# Patient Record
Sex: MALE | Race: WHITE | Employment: FULL TIME | URBAN - NONMETROPOLITAN AREA
[De-identification: names, ages, dates, MRNs, and addresses within clinical notes are randomized per-mention and may not be internally consistent; named-entity substitution may affect disease eponyms.]

---

## 2022-07-28 ENCOUNTER — OFFICE VISIT (OUTPATIENT)
Dept: FAMILY MEDICINE CLINIC | Age: 52
End: 2022-07-28
Payer: COMMERCIAL

## 2022-07-28 VITALS
HEART RATE: 55 BPM | SYSTOLIC BLOOD PRESSURE: 137 MMHG | TEMPERATURE: 98.1 F | DIASTOLIC BLOOD PRESSURE: 91 MMHG | OXYGEN SATURATION: 99 % | WEIGHT: 156.6 LBS

## 2022-07-28 DIAGNOSIS — R21 LOCALIZED RASH: Primary | ICD-10-CM

## 2022-07-28 PROCEDURE — 99213 OFFICE O/P EST LOW 20 MIN: CPT | Performed by: STUDENT IN AN ORGANIZED HEALTH CARE EDUCATION/TRAINING PROGRAM

## 2022-07-28 ASSESSMENT — PATIENT HEALTH QUESTIONNAIRE - PHQ9
SUM OF ALL RESPONSES TO PHQ QUESTIONS 1-9: 0
2. FEELING DOWN, DEPRESSED OR HOPELESS: 0
SUM OF ALL RESPONSES TO PHQ QUESTIONS 1-9: 0
SUM OF ALL RESPONSES TO PHQ9 QUESTIONS 1 & 2: 0
1. LITTLE INTEREST OR PLEASURE IN DOING THINGS: 0

## 2022-07-28 ASSESSMENT — ENCOUNTER SYMPTOMS
COUGH: 0
SHORTNESS OF BREATH: 0

## 2022-07-28 NOTE — PROGRESS NOTES
[address] [address]  Dept: [phone]  Dept Fax: [phone]  Loc: [phone]    Bogdan Carcamo is a 46 y.o. male who presents today for his medical conditions/complaints as noted below. Chief Complaint   Patient presents with    Leg Swelling     Right leg pain swelling possible tick       HPI:     Patient presents office today for the concern of a possible tick/tick bite on his leg. States that about 1 week ago he noticed a red bump on the front of his right thigh. Over the last week it has become more red and has spread out and is now about the size of a quarter. Has had some itching, no significant pain. Denies drainage or bleeding. He tried putting DEET on the area which did not cause any change. States that he is outside a lot and was living in [place] recently, so he wanted to make sure that this was not a tick or Lyme's disease. Does not remember seeing a tick on his body or leg    Denies fever or chills. History reviewed. No pertinent past medical history. History reviewed. No pertinent surgical history. History reviewed. No pertinent family history. Social History     Tobacco Use    Smoking status: Never    Smokeless tobacco: Never   Substance Use Topics    Alcohol use: Never      No current outpatient medications on file. No current facility-administered medications for this visit.      No Known Allergies    Health Maintenance   Topic Date Due    COVID-19 Vaccine (1) Never done    Depression Screen  Never done    HIV screen  Never done    Hepatitis C screen  Never done    Lipids  Never done    Colorectal Cancer Screen  Never done    Shingles vaccine (1 of 2) Never done    Flu vaccine (1) 09/01/2022    DTaP/Tdap/Td vaccine (4 - Td or Tdap) 05/10/2032    Hepatitis A vaccine  Aged Out    Hepatitis B vaccine  Aged Out    Hib vaccine  Aged Out    Meningococcal (ACWY) vaccine  Aged Out    Pneumococcal 0-64 years Vaccine  Aged Out       Subjective:      Review of Systems   Constitutional:  Negative for chills and fever. Respiratory:  Negative for cough and shortness of breath. Cardiovascular:  Negative for chest pain. Skin:  Positive for rash. Objective:     Physical Exam  Vitals and nursing note reviewed. Constitutional:       General: He is not in acute distress. Appearance: Normal appearance. He is well-developed and normal weight. He is not ill-appearing or toxic-appearing. HENT:      Head: Normocephalic and atraumatic. Eyes:      General: Lids are normal.      Conjunctiva/sclera: Conjunctivae normal.   Cardiovascular:      Rate and Rhythm: Normal rate and regular rhythm. Heart sounds: Normal heart sounds. No murmur heard. Pulmonary:      Effort: Pulmonary effort is normal.      Breath sounds: Normal breath sounds and air entry. No wheezing, rhonchi or rales. Musculoskeletal:      Right lower leg: No edema. Left lower leg: No edema. Skin:     General: Skin is warm and dry. Comments: Approx 1.5 cm x 1.5 cm circular erythematous rash with very small central bump and surrounding red dots present on anterior right thigh; no active drainage or bleeding noted   Neurological:      General: No focal deficit present. Mental Status: He is alert and oriented to person, place, and time. Gait: Gait is intact. Psychiatric:         Mood and Affect: Mood and affect normal.         Behavior: Behavior is cooperative. BP (!) 137/91 (Site: Left Upper Arm, Position: Sitting, Cuff Size: Large Adult)   Pulse 55   Temp 98.1 °F (36.7 °C)   Wt 156 lb 9.6 oz (71 kg)   SpO2 99%     Assessment/Plan:   Olivia Moran was seen today for leg swelling. Diagnoses and all orders for this visit:    Localized rash    30-year-old healthy male presenting with a small, localized rash to right anterior thigh x7 days. Does not appear infected this time.   Etiology unclear, though does not look like a bull's-eye rash-patient reassured that this does not appear to be a Lyme's disease rash at this time, though he was advised to contact our office or his PCP if rash changes or develops more into a bullseye rash. No antibiotic treatment warranted. Patient voiced that it is not bothersome to need any further treatment at this time. He was advised to monitor symptoms closely and if worsening or persisting, needs to be seen for re-evaluation. Return if symptoms worsen or fail to improve.     Electronically signed by Armida Steiner DO on 7/28/2022 at 12:54 PM

## 2023-01-11 LAB
CHOLESTEROL, TOTAL: 232 MG/DL
CHOLESTEROL/HDL RATIO: ABNORMAL
HDLC SERPL-MCNC: 85 MG/DL (ref 35–70)
LDL CHOLESTEROL: 131
NONHDLC SERPL-MCNC: ABNORMAL MG/DL
TRIGL SERPL-MCNC: 79 MG/DL
VLDLC SERPL CALC-MCNC: 16 MG/DL

## 2023-10-30 ENCOUNTER — OFFICE VISIT (OUTPATIENT)
Dept: FAMILY MEDICINE CLINIC | Age: 53
End: 2023-10-30
Payer: COMMERCIAL

## 2023-10-30 VITALS
DIASTOLIC BLOOD PRESSURE: 80 MMHG | RESPIRATION RATE: 16 BRPM | HEIGHT: 72 IN | BODY MASS INDEX: 23.11 KG/M2 | WEIGHT: 170.6 LBS | SYSTOLIC BLOOD PRESSURE: 106 MMHG | OXYGEN SATURATION: 98 % | HEART RATE: 58 BPM

## 2023-10-30 DIAGNOSIS — H91.91 CHANGE IN HEARING, RIGHT: ICD-10-CM

## 2023-10-30 DIAGNOSIS — H92.01 RIGHT EAR PAIN: Primary | ICD-10-CM

## 2023-10-30 PROCEDURE — 99213 OFFICE O/P EST LOW 20 MIN: CPT | Performed by: STUDENT IN AN ORGANIZED HEALTH CARE EDUCATION/TRAINING PROGRAM

## 2023-10-30 ASSESSMENT — ENCOUNTER SYMPTOMS
VOMITING: 0
NAUSEA: 0
COUGH: 0
DIARRHEA: 0
SORE THROAT: 0
WHEEZING: 0
ABDOMINAL PAIN: 0

## 2023-10-30 ASSESSMENT — PATIENT HEALTH QUESTIONNAIRE - PHQ9
SUM OF ALL RESPONSES TO PHQ QUESTIONS 1-9: 0
1. LITTLE INTEREST OR PLEASURE IN DOING THINGS: 0
SUM OF ALL RESPONSES TO PHQ9 QUESTIONS 1 & 2: 0
2. FEELING DOWN, DEPRESSED OR HOPELESS: 0
SUM OF ALL RESPONSES TO PHQ QUESTIONS 1-9: 0

## 2023-10-30 NOTE — PROGRESS NOTES
[address] [employer] [facility]  [address] [place] [address]  Dept: [phone]  Dept Fax: [phone]  Loc: [phone]    Steve West is a 48 y.o. male who presents today for his medical conditions/complaints as noted below. Chief Complaint   Patient presents with    Otalgia     Right side sx for 3 weeks, pt states he bought ear drops with no relief        HPI:     Patient presents to the office today for concerns of right ear pain and humming sensation. Patient states that he was traveling for work in [place] about 3 weeks ago. As he was returning home from this trip, he noticed right ear buzzing/humming and some discomfort. This has worsened since that time. Denies pulsatile or whooshing sensation in ear. He has been trying OTC eardrops without relief. Denies associated fever, sore throat, nasal congestion/drainage, or cough. States that he did start taking PreserVision around the time of symptom onset, but stopped this medication about 2 weeks ago to see if it would make a difference. History reviewed. No pertinent past medical history. History reviewed. No pertinent surgical history. History reviewed. No pertinent family history. Social History     Tobacco Use    Smoking status: Never    Smokeless tobacco: Never   Substance Use Topics    Alcohol use: Never      No current outpatient medications on file. No current facility-administered medications for this visit.      No Known Allergies    Health Maintenance   Topic Date Due    Hepatitis B vaccine (1 of 3 - 3-dose series) Never done    COVID-19 Vaccine (1) Never done    HIV screen  Never done    Hepatitis C screen  Never done    Lipids  Never done    Colorectal Cancer Screen  Never done    Shingles vaccine (1 of 2) Never done    Depression Screen  07/28/2023    Flu vaccine (1) Never done    DTaP/Tdap/Td vaccine (3 - Td or Tdap) 05/06/2032    Hepatitis A vaccine  Aged Out

## 2024-02-07 ENCOUNTER — OFFICE VISIT (OUTPATIENT)
Dept: FAMILY MEDICINE CLINIC | Age: 54
End: 2024-02-07
Payer: COMMERCIAL

## 2024-02-07 VITALS
OXYGEN SATURATION: 97 % | SYSTOLIC BLOOD PRESSURE: 120 MMHG | HEIGHT: 72 IN | TEMPERATURE: 98.4 F | BODY MASS INDEX: 22.89 KG/M2 | WEIGHT: 169 LBS | HEART RATE: 73 BPM | DIASTOLIC BLOOD PRESSURE: 74 MMHG

## 2024-02-07 DIAGNOSIS — J02.9 SORE THROAT: Primary | ICD-10-CM

## 2024-02-07 DIAGNOSIS — H66.003 ACUTE SUPPURATIVE OTITIS MEDIA OF BOTH EARS WITHOUT SPONTANEOUS RUPTURE OF TYMPANIC MEMBRANES, RECURRENCE NOT SPECIFIED: ICD-10-CM

## 2024-02-07 LAB
INFLUENZA VIRUS A RNA: NEGATIVE
INFLUENZA VIRUS B RNA: NEGATIVE

## 2024-02-07 PROCEDURE — 99214 OFFICE O/P EST MOD 30 MIN: CPT | Performed by: STUDENT IN AN ORGANIZED HEALTH CARE EDUCATION/TRAINING PROGRAM

## 2024-02-07 PROCEDURE — 87502 INFLUENZA DNA AMP PROBE: CPT | Performed by: STUDENT IN AN ORGANIZED HEALTH CARE EDUCATION/TRAINING PROGRAM

## 2024-02-07 RX ORDER — AMOXICILLIN AND CLAVULANATE POTASSIUM 875; 125 MG/1; MG/1
1 TABLET, FILM COATED ORAL 2 TIMES DAILY
Qty: 14 TABLET | Refills: 0 | Status: SHIPPED | OUTPATIENT
Start: 2024-02-07 | End: 2024-02-14

## 2024-02-07 SDOH — ECONOMIC STABILITY: FOOD INSECURITY: WITHIN THE PAST 12 MONTHS, YOU WORRIED THAT YOUR FOOD WOULD RUN OUT BEFORE YOU GOT MONEY TO BUY MORE.: NEVER TRUE

## 2024-02-07 SDOH — ECONOMIC STABILITY: HOUSING INSECURITY
IN THE LAST 12 MONTHS, WAS THERE A TIME WHEN YOU DID NOT HAVE A STEADY PLACE TO SLEEP OR SLEPT IN A SHELTER (INCLUDING NOW)?: NO

## 2024-02-07 SDOH — ECONOMIC STABILITY: INCOME INSECURITY: HOW HARD IS IT FOR YOU TO PAY FOR THE VERY BASICS LIKE FOOD, HOUSING, MEDICAL CARE, AND HEATING?: NOT HARD AT ALL

## 2024-02-07 SDOH — ECONOMIC STABILITY: FOOD INSECURITY: WITHIN THE PAST 12 MONTHS, THE FOOD YOU BOUGHT JUST DIDN'T LAST AND YOU DIDN'T HAVE MONEY TO GET MORE.: NEVER TRUE

## 2024-02-07 ASSESSMENT — ENCOUNTER SYMPTOMS
COUGH: 0
DIARRHEA: 0
NAUSEA: 0
SHORTNESS OF BREATH: 0
VOMITING: 0
CONSTIPATION: 0

## 2024-02-07 ASSESSMENT — PATIENT HEALTH QUESTIONNAIRE - PHQ9
SUM OF ALL RESPONSES TO PHQ9 QUESTIONS 1 & 2: 0
SUM OF ALL RESPONSES TO PHQ QUESTIONS 1-9: 0
1. LITTLE INTEREST OR PLEASURE IN DOING THINGS: 0
2. FEELING DOWN, DEPRESSED OR HOPELESS: 0
SUM OF ALL RESPONSES TO PHQ QUESTIONS 1-9: 0

## 2024-02-07 NOTE — PROGRESS NOTES
[facility]  [facility]  [address] [facility] [id number] [phone]     Soledad Shell is a 53 y.o. male who presents today for:  Chief Complaint   Patient presents with    Pharyngitis     Went to [facility], sore throat for 4 days, sinus pressure, cough, fatigued, body aches.   At home covid was negative this morning. Has been taking tylenol        Assessment/Plan:     Soledad was seen today for pharyngitis.    Diagnoses and all orders for this visit:    Sore throat  -     POCT Influenza A/B DNA (Alere i)    Acute suppurative otitis media of both ears without spontaneous rupture of tympanic membranes, recurrence not specified  -     amoxicillin-clavulanate (AUGMENTIN) 875-125 MG per tablet; Take 1 tablet by mouth 2 times daily for 7 days      Exam concerning for otitis media, will treat with abx as above.   Otherwise supportive care as below.   Symptomatic therapy suggested: gargle for sore throat, use mist at bedside for congestion.  Apply facial warm packs for sinus pain.  May use acetaminophen, ibuprofen prn. Recommended increased hydration, small frequent meals, and resting when able.              No follow-ups on file.      Medications Prescribed:  Orders Placed This Encounter   Medications    amoxicillin-clavulanate (AUGMENTIN) 875-125 MG per tablet     Sig: Take 1 tablet by mouth 2 times daily for 7 days     Dispense:  14 tablet     Refill:  0       No future appointments.    HPI:     HPI  Was in [facility] for 10-12 days. No known sick contacts but interacted with a lot of people. Symptoms started 4 days ago, just got back last night.     No one else sick that he went with.     Is also having ear pressure/pain, sore throat, sinus pressure/pain. Productive cough.     Subjective:      Review of Systems   Constitutional:  Negative for chills, diaphoresis, fatigue and fever.   Respiratory:  Negative for cough and shortness of breath.

## 2024-11-04 ENCOUNTER — OFFICE VISIT (OUTPATIENT)
Dept: FAMILY MEDICINE CLINIC | Age: 54
End: 2024-11-04
Payer: COMMERCIAL

## 2024-11-04 VITALS
DIASTOLIC BLOOD PRESSURE: 84 MMHG | RESPIRATION RATE: 18 BRPM | SYSTOLIC BLOOD PRESSURE: 130 MMHG | HEART RATE: 59 BPM | BODY MASS INDEX: 23.11 KG/M2 | WEIGHT: 170.4 LBS | OXYGEN SATURATION: 98 %

## 2024-11-04 DIAGNOSIS — H10.9 CONJUNCTIVITIS OF BOTH EYES, UNSPECIFIED CONJUNCTIVITIS TYPE: ICD-10-CM

## 2024-11-04 DIAGNOSIS — J02.9 SORE THROAT: Primary | ICD-10-CM

## 2024-11-04 DIAGNOSIS — J18.9 ATYPICAL PNEUMONIA: ICD-10-CM

## 2024-11-04 PROCEDURE — 99214 OFFICE O/P EST MOD 30 MIN: CPT | Performed by: STUDENT IN AN ORGANIZED HEALTH CARE EDUCATION/TRAINING PROGRAM

## 2024-11-04 RX ORDER — AZITHROMYCIN 250 MG/1
TABLET, FILM COATED ORAL
Qty: 6 TABLET | Refills: 0 | Status: SHIPPED | OUTPATIENT
Start: 2024-11-04 | End: 2024-11-14

## 2024-11-04 RX ORDER — POLYMYXIN B SULFATE AND TRIMETHOPRIM 1; 10000 MG/ML; [USP'U]/ML
1 SOLUTION OPHTHALMIC EVERY 4 HOURS
Qty: 3 ML | Refills: 0 | Status: SHIPPED | OUTPATIENT
Start: 2024-11-04 | End: 2024-11-14

## 2024-11-04 RX ORDER — FLUTICASONE PROPIONATE 50 MCG
SPRAY, SUSPENSION (ML) NASAL
Qty: 16 G | Refills: 0 | Status: SHIPPED | OUTPATIENT
Start: 2024-11-04

## 2024-11-04 ASSESSMENT — ENCOUNTER SYMPTOMS
EYE ITCHING: 0
VOMITING: 0
PHOTOPHOBIA: 0
SORE THROAT: 1
SHORTNESS OF BREATH: 0
EYE DISCHARGE: 1
EYE PAIN: 0
TROUBLE SWALLOWING: 0
NAUSEA: 0
VOICE CHANGE: 0
CONSTIPATION: 0
COUGH: 1
EYE REDNESS: 1
DIARRHEA: 0

## 2024-11-04 NOTE — PROGRESS NOTES
for one week.     Dispense:  16 g     Refill:  0       No future appointments.    HPI:     HPI  54-year-old male with no significant past medical history who presents as acute walk-in visit for conjunctivitis, sore throat, cough.    Patient has had conjunctivitis for about 12 days.  He notes that he has had drainage from both eyes.  He woke up this morning with some matted shut.  Both eyes pink throughout the day.    Has had a sore throat for about 12 days.  Thinks it started with a viral or allergies but has not gone away.  Did need did take Tylenol last night due to being so with a sore at 3 AM.    Cough has been going on for about 12 days to, productive.  Recently came back from a trip from [place] where lots people on the trip were sick.  No at home testing.  Declines other testing today.  No fevers with this.  Subjective:      Review of Systems   Constitutional:  Negative for chills, diaphoresis, fatigue and fever.   HENT:  Positive for congestion, postnasal drip and sore throat. Negative for trouble swallowing and voice change.    Eyes:  Positive for discharge and redness. Negative for photophobia, pain, itching and visual disturbance.   Respiratory:  Positive for cough. Negative for shortness of breath.    Cardiovascular:  Negative for chest pain and leg swelling.   Gastrointestinal:  Negative for constipation, diarrhea, nausea and vomiting.   Neurological:  Negative for dizziness, light-headedness and headaches.         Objective:     Vitals:    11/04/24 0822   BP: 130/84   Site: Left Upper Arm   Position: Sitting   Pulse: 59   Resp: 18   SpO2: 98%   Weight: 77.3 kg (170 lb 6.4 oz)       Body mass index is 23.11 kg/m².    Wt Readings from Last 3 Encounters:   11/04/24 77.3 kg (170 lb 6.4 oz)   02/07/24 76.7 kg (169 lb)   10/30/23 77.4 kg (170 lb 9.6 oz)     BP Readings from Last 3 Encounters:   11/04/24 130/84   02/07/24 120/74   10/30/23 106/80       Physical Exam  Constitutional:       Appearance: Normal